# Patient Record
Sex: FEMALE | Race: WHITE | NOT HISPANIC OR LATINO | Employment: OTHER | URBAN - METROPOLITAN AREA
[De-identification: names, ages, dates, MRNs, and addresses within clinical notes are randomized per-mention and may not be internally consistent; named-entity substitution may affect disease eponyms.]

---

## 2024-05-13 ENCOUNTER — OFFICE VISIT (OUTPATIENT)
Dept: URGENT CARE | Facility: CLINIC | Age: 54
End: 2024-05-13
Payer: MEDICARE

## 2024-05-13 VITALS
OXYGEN SATURATION: 96 % | TEMPERATURE: 98 F | HEART RATE: 81 BPM | WEIGHT: 226.19 LBS | BODY MASS INDEX: 38.62 KG/M2 | HEIGHT: 64 IN | SYSTOLIC BLOOD PRESSURE: 123 MMHG | DIASTOLIC BLOOD PRESSURE: 84 MMHG | RESPIRATION RATE: 16 BRPM

## 2024-05-13 DIAGNOSIS — H00.024 HORDEOLUM INTERNUM LEFT UPPER EYELID: Primary | ICD-10-CM

## 2024-05-13 PROCEDURE — 99203 OFFICE O/P NEW LOW 30 MIN: CPT | Mod: S$GLB,,,

## 2024-05-13 RX ORDER — ERYTHROMYCIN 5 MG/G
OINTMENT OPHTHALMIC EVERY 6 HOURS
Qty: 3.5 G | Refills: 0 | Status: SHIPPED | OUTPATIENT
Start: 2024-05-13 | End: 2024-05-20

## 2024-05-13 RX ORDER — HYDROCHLOROTHIAZIDE 25 MG/1
25 TABLET ORAL
COMMUNITY
Start: 2024-04-19

## 2024-05-13 RX ORDER — SERTRALINE HYDROCHLORIDE 25 MG/1
25 TABLET, FILM COATED ORAL
COMMUNITY
Start: 2024-04-19

## 2024-05-13 NOTE — PATIENT INSTRUCTIONS
- Rest.    - Drink plenty of fluids.    - Acetaminophen (tylenol) or Ibuprofen (advil,motrin) as directed as needed for fever/pain. Avoid tylenol if you have a history of liver disease. Do not take ibuprofen if you have a history of GI bleeding, kidney disease, or if you take blood thinners.     - apply warm compresses multiple times daily- soak washcloth in warm water, apply to eye for 10-15 minutes at a time at least 3-5 times daily. This is the most beneficial thing to do for a stye     - use erythromycin ointment as directed to border of eyelid. This is an eye ointment and is safe to get in the eye- use to border/inner border, you can apply with cotton swab    - Follow up with your PCP or specialty clinic as directed in the next 1-2 weeks if not improved or as needed.  You can call [phone] to schedule an appointment with the appropriate provider.    - Go to the ER or seek medical attention immediately if you develop new or worsening symptoms.    - You must understand that you have received an Urgent Care treatment only and that you may be released before all of your medical problems are known or treated.   - You, the patient, will arrange for follow up care as instructed.   - If your condition worsens or fails to improve we recommend that you receive another evaluation at the ER immediately or contact your PCP to discuss your concerns or return here.

## 2024-05-13 NOTE — PROGRESS NOTES
"Subjective:      Patient ID: Dorcas Castro is a 53 y.o. female.    Vitals:  height is 5' 4" (1.626 m) and weight is 102.6 kg (226 lb 3.1 oz). Her temperature is 98.4 °F (36.9 °C). Her blood pressure is 123/84 and her pulse is 81. Her respiration is 16 and oxygen saturation is 96%.     Chief Complaint: Eye Problem    54 y/o F who presents with R eye irritation that started last night. Thought she had a possible eyelash in her R eye. She flushed it out with contact solution. Wears contact lenses. Denies any vision changes, photophobia, floaters, pain with EOMs, headache, rash, n/v.    Eye Problem   The right eye is affected. This is a new problem. The current episode started yesterday. The problem occurs constantly. The problem has been gradually worsening. There was no injury mechanism. The pain is at a severity of 3/10. The pain is mild. There is No known exposure to pink eye. She Wears contacts. Associated symptoms include eye redness and a foreign body sensation. Pertinent negatives include no blurred vision, eye discharge, double vision, fever, itching, nausea, photophobia, recent URI or vomiting. She has tried eye drops for the symptoms. The treatment provided no relief.       Constitution: Negative for fever.   Eyes:  Positive for eye pain and eye redness. Negative for eye trauma, foreign body in eye, eye discharge, eye itching, photophobia, vision loss, double vision, blurred vision and eyelid swelling.   Respiratory:  Negative for cough.    Gastrointestinal:  Negative for nausea and vomiting.   Skin:  Negative for rash.   Neurological:  Negative for dizziness and headaches.      Objective:     Physical Exam   Constitutional: She is oriented to person, place, and time. She appears well-developed.   HENT:   Head: Normocephalic and atraumatic.   Ears:   Right Ear: External ear normal.   Left Ear: External ear normal.   Nose: Nose normal.   Mouth/Throat: Oropharynx is clear and moist.   Eyes: " Conjunctivae, EOM and lids are normal. Pupils are equal, round, and reactive to light. Right eye exhibits hordeolum. Right eye exhibits no chemosis and no discharge. No foreign body present in the right eye. Left eye exhibits no chemosis, no discharge and no hordeolum. No foreign body present in the left eye. Right conjunctiva is not injected. Left conjunctiva is not injected.   Slit lamp exam:       The right eye shows no corneal abrasion and no fluorescein uptake.       Neck: Trachea normal and phonation normal. Neck supple.   Musculoskeletal: Normal range of motion.         General: Normal range of motion.   Neurological: She is alert and oriented to person, place, and time.   Skin: Skin is warm, dry and intact.   Psychiatric: Her speech is normal and behavior is normal. Judgment and thought content normal.   Nursing note and vitals reviewed.    Assessment:     1. Hordeolum internum left upper eyelid      Plan:     Hordeolum internum left upper eyelid  -     erythromycin (ROMYCIN) ophthalmic ointment; Place into the right eye every 6 (six) hours. for 7 days  Dispense: 3.5 g; Refill: 0              Patient Instructions   - Rest.    - Drink plenty of fluids.    - Acetaminophen (tylenol) or Ibuprofen (advil,motrin) as directed as needed for fever/pain. Avoid tylenol if you have a history of liver disease. Do not take ibuprofen if you have a history of GI bleeding, kidney disease, or if you take blood thinners.     - apply warm compresses multiple times daily- soak washcloth in warm water, apply to eye for 10-15 minutes at a time at least 3-5 times daily. This is the most beneficial thing to do for a stye     - use erythromycin ointment as directed to border of eyelid. This is an eye ointment and is safe to get in the eye- use to border/inner border, you can apply with cotton swab    - Follow up with your PCP or specialty clinic as directed in the next 1-2 weeks if not improved or as needed.  You can call [phone] to schedule an appointment with the appropriate provider.    - Go to the ER or seek medical attention immediately if you develop new or worsening symptoms.    - You must understand that you have received an Urgent Care treatment only and that you may be released before all of your medical problems are known or treated.   - You, the patient, will arrange for follow up care as instructed.   - If your condition worsens or fails to improve we recommend that you receive another evaluation at the ER immediately or contact your PCP to discuss your concerns or return here.

## 2024-05-20 ENCOUNTER — OFFICE VISIT (OUTPATIENT)
Dept: URGENT CARE | Facility: CLINIC | Age: 54
End: 2024-05-20
Payer: MEDICARE

## 2024-05-20 VITALS
OXYGEN SATURATION: 96 % | HEIGHT: 64 IN | WEIGHT: 225 LBS | DIASTOLIC BLOOD PRESSURE: 88 MMHG | TEMPERATURE: 99 F | BODY MASS INDEX: 38.41 KG/M2 | SYSTOLIC BLOOD PRESSURE: 119 MMHG | RESPIRATION RATE: 16 BRPM | HEART RATE: 84 BPM

## 2024-05-20 DIAGNOSIS — H65.03 NON-RECURRENT ACUTE SEROUS OTITIS MEDIA OF BOTH EARS: ICD-10-CM

## 2024-05-20 DIAGNOSIS — H00.015 HORDEOLUM EXTERNUM OF LEFT LOWER EYELID: ICD-10-CM

## 2024-05-20 DIAGNOSIS — S05.02XA CORNEAL ABRASION, LEFT, INITIAL ENCOUNTER: Primary | ICD-10-CM

## 2024-05-20 DIAGNOSIS — H57.12 ACUTE LEFT EYE PAIN: ICD-10-CM

## 2024-05-20 PROCEDURE — 99214 OFFICE O/P EST MOD 30 MIN: CPT | Mod: S$GLB,,, | Performed by: PHYSICIAN ASSISTANT

## 2024-05-20 RX ORDER — CIPROFLOXACIN HYDROCHLORIDE 3 MG/ML
1 SOLUTION/ DROPS OPHTHALMIC EVERY 4 HOURS
Qty: 5 ML | Refills: 0 | Status: SHIPPED | OUTPATIENT
Start: 2024-05-20 | End: 2024-05-20

## 2024-05-20 RX ORDER — ERYTHROMYCIN 5 MG/G
OINTMENT OPHTHALMIC
Qty: 3.5 G | Refills: 0 | Status: SHIPPED | OUTPATIENT
Start: 2024-05-20

## 2024-05-20 RX ORDER — CIPROFLOXACIN HYDROCHLORIDE 3 MG/ML
2 SOLUTION/ DROPS OPHTHALMIC EVERY 4 HOURS
Qty: 5 ML | Refills: 0 | Status: SHIPPED | OUTPATIENT
Start: 2024-05-20 | End: 2024-05-27

## 2024-05-20 NOTE — LETTER
"  May 20, 2024      [facility]  [address]  [place] [address]  Phone: [phone]  Fax: [phone]       Patient: Dorcas Castro   YOB: 1970  Date of Visit: 05/20/2024    To Whom It May Concern:    Bereket Castro  was at [facility] on 05/20/2024. The patient may return to work/school on 5/21/24 with no restrictions. If you have any questions or concerns, or if I can be of further assistance, please do not hesitate to contact me.    Sincerely,        Alta Moran PA-C (Jackie)       "

## 2024-05-20 NOTE — PROGRESS NOTES
"Subjective:      Patient ID: Dorcas Castro is a 53 y.o. female.    Vitals:  height is 5' 4" (1.626 m) and weight is 102.1 kg (225 lb). Her oral temperature is 98.5 °F (36.9 °C). Her blood pressure is 119/88 and her pulse is 84. Her respiration is 16 and oxygen saturation is 96%.     Chief Complaint: Eye Problem    Dorcas Castro is a 53 y.o. female who complains of left eye pain that started yesterday. She was seen 7 days ago on 5/13/24 for a stye on her right eye. She states she did not wear disposable contacts for a week until last night.  Left eye pain rated as 5/10; watery, gritty sensation of foreign body. Has tried warm compresses without any relief.     Eye Problem   The left eye is affected. This is a new problem. The current episode started yesterday. The problem occurs constantly. The problem has been unchanged. The injury mechanism was contact lenses. The pain is at a severity of 5/10. The pain is moderate. There is No known exposure to pink eye. She Wears contacts. Associated symptoms include eye redness and a foreign body sensation. Pertinent negatives include no blurred vision, eye discharge, double vision, fever, itching, nausea, photophobia, recent URI or vomiting. She has tried eye drops for the symptoms. The treatment provided mild relief.     Constitution: Negative for chills, sweating, fever and generalized weakness.   HENT:  Negative for congestion, postnasal drip, sore throat, trouble swallowing and voice change.    Cardiovascular:  Negative for chest pain, leg swelling, palpitations and sob on exertion.   Eyes:  Positive for foreign body in eye, eye pain and eye redness. Negative for eye trauma, eye discharge, eye itching, photophobia, double vision, blurred vision and eyelid swelling.   Respiratory:  Negative for cough, sputum production, shortness of breath, wheezing and asthma.    Gastrointestinal:  Negative for nausea and vomiting.   Musculoskeletal:  Negative for " muscle cramps and muscle ache.   Allergic/Immunologic: Positive for environmental allergies and seasonal allergies. Negative for asthma.   Psychiatric/Behavioral:  Negative for nervous/anxious. The patient is not nervous/anxious.       Objective:     Physical Exam   Constitutional: She is oriented to person, place, and time. No distress.      Comments:Patient is awake and alert, sitting up in exam chair, speaking and answering in complete sentences     normal  HENT:   Head: Normocephalic and atraumatic.   Ears:   Right Ear: External ear and ear canal normal. A middle ear effusion is present.   Left Ear: External ear normal. A middle ear effusion is present.   Nose: Nose normal.   Mouth/Throat: Mucous membranes are moist. Oropharynx is clear.   Eyes: Right eye visual fields normal and left eye visual fields normal. Lids are normal. Pupils are equal, round, and reactive to light. Lids are everted and swept, no foreign bodies found. Left eye exhibits hordeolum. Left eye exhibits no chemosis, no discharge and no exudate. No foreign body present in the left eye. Left conjunctiva is injected. Left conjunctiva has no hemorrhage.   Slit lamp exam:       The left eye shows corneal abrasion and fluorescein uptake. Extraocular movement intact vision grossly intact gaze aligned appropriately      Comments: Erythematous tender papule to left lower eyelid margin     Neck: Neck supple.   Cardiovascular: Normal rate, regular rhythm, normal heart sounds and normal pulses.   Pulmonary/Chest: Effort normal and breath sounds normal.   Abdominal: Normal appearance.   Musculoskeletal: Normal range of motion.         General: Normal range of motion.   Neurological: She is alert and oriented to person, place, and time.   Skin: Skin is warm. Capillary refill takes less than 2 seconds.   Psychiatric: Her behavior is normal. Mood, judgment and thought content normal.   Nursing note and vitals reviewed.      Vision Screening    Right eye Left  eye Both eyes   Without correction      With correction 20/40 20/50 20/40       Assessment:     1. Corneal abrasion, left, initial encounter    2. Acute left eye pain    3. Non-recurrent acute serous otitis media of both ears    4. Hordeolum externum of left lower eyelid      Patient presents with clinical exam findings and history consistent with above.      On exam, patient is nontoxic appearing and vitals are stable.      Diagnostic testing results were reviewed and discussed with patient/guardian.   Tests ordered in clinic: None  Previous progress notes/admissions/labs and medications were reviewed.    Plan:   In cases of eye pain of unknown origin, proparacaine ophthalmic solution was used to rule out surface causes of pain (i.e. corneal or epithelial abrasion). Patient reported eye pain resolved a few seconds after 1-2 drops of proparacaine. Abrasions noted to cornea during fluorescein eye stain test.     Pt instructed to stop wearing contacts until eye heals. She will call her ophthalmologist. She is driving to [place] in 3 days.       Corneal abrasion, left, initial encounter  -     Discontinue: ciprofloxacin HCl (CILOXAN) 0.3 % ophthalmic solution; Place 1 drop into the left eye every 4 (four) hours. for 7 days  Dispense: 5 mL; Refill: 0  -     Ambulatory referral/consult to Ophthalmology  -     ciprofloxacin HCl (CILOXAN) 0.3 % ophthalmic solution; Place 2 drops into the left eye every 4 (four) hours. for 7 days  Dispense: 5 mL; Refill: 0    Acute left eye pain  -     Visual acuity screening  -     Discontinue: ciprofloxacin HCl (CILOXAN) 0.3 % ophthalmic solution; Place 1 drop into the left eye every 4 (four) hours. for 7 days  Dispense: 5 mL; Refill: 0  -     Ambulatory referral/consult to Ophthalmology  -     ciprofloxacin HCl (CILOXAN) 0.3 % ophthalmic solution; Place 2 drops into the left eye every 4 (four) hours. for 7 days  Dispense: 5 mL; Refill: 0    Non-recurrent acute serous otitis media of  "both ears    Hordeolum externum of left lower eyelid  -     erythromycin (ROMYCIN) ophthalmic ointment; Use q-tip and apply thin layer to stye to left lower eyelid QID.  Dispense: 3.5 g; Refill: 0                    1) See orders for this visit as documented in the electronic medical record.  2) Symptomatic therapy suggested: use acetaminophen/ibuprofen every 6-8 hours prn pain or fever, push fluids.   3) Call or return to clinic prn if these symptoms worsen or fail to improve as anticipated.    Discussed results/diagnosis/plan with patient in clinic.  We had shared decision making for patient's treatment. Patient verbalized understanding and in agreement with current treatment plan.     Patient was instructed to return for re-evaluation with urgent care or PCP for continued outpatient workup and management if symptoms do not improve/worsening symptoms. Strict ED versus clinic precautions given in depth.    Discharge and follow-up instructions given verbally/printed with the patient who expressed understanding. The instructions and results are also available on tweetTVt.              Alta "Mee Moran PA-C          Patient Instructions   You may be more comfortable if you rest with your eyes closed. Bright lights may also cause eye pain. This is normal during the first day after the scratch.  Use your eye drops or eye ointments as ordered  Do not press or rub your eyes.  Do not wear contact lenses until your eye has healed. Check with your regular eye doctor about when you can start to wear them again. After that, be sure to follow all instructions on proper care. This will help you avoid another corneal abrasion or serious infection.      Erythromycin ointment: Use q-tip and apply thin layer to stye to left lower eyelid.  Put a warm, wet compress on the stye. Wet a clean washcloth with warm water, and put it over your stye. When the washcloth cools, reheat it with warm water and put it back over the stye. Repeat " these steps for about 10 minutes, and try to do this 3 times a day.  It might help to gently massage your eyelid.  Do not squeeze or try to pop your stye. This can make it worse.  Do not wear eye makeup or contact lenses until your stye is gone.  If not allergic, take Tylenol (Acetaminophen) 325 to 650 mg to  1 g every 4 to 6 hours as needed for fever and/or Motrin (Ibuprofen) 400 to 800 mg every 6 hours as needed for fever as directed for control of pain and/or fever  Use a no tears shampoo and warm water to wash your eyelids with care.  Replace old make up. Throw away mascara, liquid eyeliner, and eye shadow 3 months after first using them.  Remove makeup before sleep each night.  Do not share towels or washcloths.  Wash hands before putting in and taking out contact lenses  If the hordeolum does not reduce in size within two weeks, we advise referral to an ophthalmologist for potential incision and drainage       Ear congestion/seasonal allergies  Recommend oral antihistamine (Claritin/zyrtec) +/- oral decongestant (pseudoephedrine) for rhinorrhea, steroid nasal spray (flonase),Tylenol (Acetaminophen) and/or Motrin (Ibuprofen) as directed for control of pain and/or fever.    For nose bleeds; recommend nasal irrigation with a saline spray/gel (AYR nasal gel) or Netti Pot like device per their directions is also recommended.  Please drink plenty of fluids.  Please get plenty of rest.  If you  smoke, please stop smoking.      Discussed prescriptions and over-the-counter medicines to help with patient's symptoms:  A steroid nose spray (flonase) can help with a stuffy nose. It can also help with drainage down the back of your throat.  An antihistamine (loratadine,zyrtec,allegra, xyzal) can help with itching, sneezing, or runny nose.  An antihistamine eye drop can help with itchy eyes.  A decongestant (pseudoephedrine,  Phenylephrine) can help with a stuffy nose. Take <10 days for congestion and rhinorrhea. Once  symptoms improve, proceed with loratadine/zyrtec once a day. These ingredients can keep you up all night, decrease appetite, feel jittery, and raise blood pressure with long term use.          Please remember that you have received care at an urgent care today. Urgent cares are not emergency rooms and are not equipped to handle life threatening emergencies and cannot rule in or out certain medical conditions and you may be released before all of your medical problems are known or treated. Please arrange follow up with your primary care physician or speciality clinic (ophthalmology)  within 2-5 days if your signs and symptoms have not resolved or worsen. Patient can call our Referral Hotline at [phone] to make an appointment.    Please return here or go to the Emergency Department for any concerns or worsening of condition.Patient was educated on signs/symptoms that would warrant emergent medical attention. Patient verbalized understanding.  Signs of infection. These include a fever of 100.4°F (38°C) or higher; chills.  Swelling and redness around one or both eyes  More eye drainage  Hard to see or move one or both eyes  Pain and discomfort around the eyes  Increased tears for more than a week

## 2024-05-20 NOTE — PATIENT INSTRUCTIONS
You may be more comfortable if you rest with your eyes closed. Bright lights may also cause eye pain. This is normal during the first day after the scratch.  Use your eye drops or eye ointments as ordered  Do not press or rub your eyes.  Do not wear contact lenses until your eye has healed. Check with your regular eye doctor about when you can start to wear them again. After that, be sure to follow all instructions on proper care. This will help you avoid another corneal abrasion or serious infection.      Erythromycin ointment: Use q-tip and apply thin layer to stye to left lower eyelid.  Put a warm, wet compress on the stye. Wet a clean washcloth with warm water, and put it over your stye. When the washcloth cools, reheat it with warm water and put it back over the stye. Repeat these steps for about 10 minutes, and try to do this 3 times a day.  It might help to gently massage your eyelid.  Do not squeeze or try to pop your stye. This can make it worse.  Do not wear eye makeup or contact lenses until your stye is gone.  If not allergic, take Tylenol (Acetaminophen) 325 to 650 mg to  1 g every 4 to 6 hours as needed for fever and/or Motrin (Ibuprofen) 400 to 800 mg every 6 hours as needed for fever as directed for control of pain and/or fever  Use a no tears shampoo and warm water to wash your eyelids with care.  Replace old make up. Throw away mascara, liquid eyeliner, and eye shadow 3 months after first using them.  Remove makeup before sleep each night.  Do not share towels or washcloths.  Wash hands before putting in and taking out contact lenses  If the hordeolum does not reduce in size within two weeks, we advise referral to an ophthalmologist for potential incision and drainage       Ear congestion/seasonal allergies  Recommend oral antihistamine (Claritin/zyrtec) +/- oral decongestant (pseudoephedrine) for rhinorrhea, steroid nasal spray (flonase),Tylenol (Acetaminophen) and/or Motrin (Ibuprofen) as  directed for control of pain and/or fever.    For nose bleeds; recommend nasal irrigation with a saline spray/gel (AYR nasal gel) or Netti Pot like device per their directions is also recommended.  Please drink plenty of fluids.  Please get plenty of rest.  If you  smoke, please stop smoking.      Discussed prescriptions and over-the-counter medicines to help with patient's symptoms:  A steroid nose spray (flonase) can help with a stuffy nose. It can also help with drainage down the back of your throat.  An antihistamine (loratadine,zyrtec,allegra, xyzal) can help with itching, sneezing, or runny nose.  An antihistamine eye drop can help with itchy eyes.  A decongestant (pseudoephedrine,  Phenylephrine) can help with a stuffy nose. Take <10 days for congestion and rhinorrhea. Once symptoms improve, proceed with loratadine/zyrtec once a day. These ingredients can keep you up all night, decrease appetite, feel jittery, and raise blood pressure with long term use.          Please remember that you have received care at an urgent care today. Urgent cares are not emergency rooms and are not equipped to handle life threatening emergencies and cannot rule in or out certain medical conditions and you may be released before all of your medical problems are known or treated. Please arrange follow up with your primary care physician or speciality clinic (ophthalmology)  within 2-5 days if your signs and symptoms have not resolved or worsen. Patient can call our Referral Hotline at [phone] to make an appointment.    Please return here or go to the Emergency Department for any concerns or worsening of condition.Patient was educated on signs/symptoms that would warrant emergent medical attention. Patient verbalized understanding.  Signs of infection. These include a fever of 100.4°F (38°C) or higher; chills.  Swelling and redness around one or both eyes  More eye drainage  Hard to see or move one or both eyes  Pain and  discomfort around the eyes  Increased tears for more than a week

## 2024-05-22 ENCOUNTER — TELEPHONE (OUTPATIENT)
Dept: OPHTHALMOLOGY | Facility: CLINIC | Age: 54
End: 2024-05-22
Payer: MEDICARE